# Patient Record
Sex: FEMALE | NOT HISPANIC OR LATINO | Employment: FULL TIME | URBAN - METROPOLITAN AREA
[De-identification: names, ages, dates, MRNs, and addresses within clinical notes are randomized per-mention and may not be internally consistent; named-entity substitution may affect disease eponyms.]

---

## 2019-09-09 ENCOUNTER — TRANSCRIBE ORDERS (OUTPATIENT)
Dept: MAMMOGRAPHY | Facility: HOSPITAL | Age: 44
End: 2019-09-09

## 2019-09-09 DIAGNOSIS — Z12.31 VISIT FOR SCREENING MAMMOGRAM: Primary | ICD-10-CM

## 2019-09-24 ENCOUNTER — APPOINTMENT (OUTPATIENT)
Dept: MAMMOGRAPHY | Facility: HOSPITAL | Age: 44
End: 2019-09-24

## 2020-10-06 ENCOUNTER — E-VISIT (OUTPATIENT)
Dept: FAMILY MEDICINE CLINIC | Facility: TELEHEALTH | Age: 45
End: 2020-10-06

## 2020-10-06 DIAGNOSIS — B37.9 YEAST INFECTION: Primary | ICD-10-CM

## 2020-10-06 PROCEDURE — 99422 OL DIG E/M SVC 11-20 MIN: CPT | Performed by: NURSE PRACTITIONER

## 2020-10-06 RX ORDER — FLUCONAZOLE 150 MG/1
150 TABLET ORAL EVERY OTHER DAY
Qty: 2 TABLET | Refills: 0 | Status: SHIPPED | OUTPATIENT
Start: 2020-10-06 | End: 2020-10-09

## 2020-10-06 NOTE — PROGRESS NOTES
Anette SANCHEZ Anjum    1975  [id number]    I have reviewed the e-Visit questionnaire and patient's answers, my assessment and plan are as follows:    HPI  Patient reports a complaint of vaginal pain and itching. Has a white milky discharge. Denies fever, genital sores, recent antibiotics, or current breastfeeding. LMP within the last 2 weeks. Similar symptoms in the past were treated with pills for yeast infection. Patient reports she was having similar symptoms prior to menstrual cycle, used a diflucan and symptoms improved. Now that her cycle is over, symptoms have returned and are worse. Cannot see GYN until 10-20-20.     Review of Systems - Negative except those listed in the HPI.      Diagnoses and all orders for this visit:    Yeast infection  -     fluconazole (Diflucan) 150 MG tablet; Take 1 tablet by mouth Every Other Day for 2 doses.          FOLLOW-UP  If symptoms worsen or fail to improve, follow-up with PCP/Urgent Care/Emergency Department.      Time Documentation  Counseled patient  Counseling topics: diagnosis, treatment options and follow up plan  Total encounter time: counseling time more than 50% of visit: 15 minutes        JIM Anthony  10/06/20  10:53 EDT

## 2020-10-06 NOTE — PATIENT INSTRUCTIONS
Vaginal Yeast infection, Adult    Vaginal yeast infection is a condition that causes vaginal discharge as well as soreness, swelling, and redness (inflammation) of the vagina. This is a common condition. Some women get this infection frequently.  What are the causes?  This condition is caused by a change in the normal balance of the yeast (candida) and bacteria that live in the vagina. This change causes an overgrowth of yeast, which causes the inflammation.  What increases the risk?  The condition is more likely to develop in women who:  · Take antibiotic medicines.  · Have diabetes.  · Take birth control pills.  · Are pregnant.  · Douche often.  · Have a weak body defense system (immune system).  · Have been taking steroid medicines for a long time.  · Frequently wear tight clothing.  What are the signs or symptoms?  Symptoms of this condition include:  · White, thick, creamy vaginal discharge.  · Swelling, itching, redness, and irritation of the vagina. The lips of the vagina (vulva) may be affected as well.  · Pain or a burning feeling while urinating.  · Pain during sex.  How is this diagnosed?  This condition is diagnosed based on:  · Your medical history.  · A physical exam.  · A pelvic exam. Your health care provider will examine a sample of your vaginal discharge under a microscope. Your health care provider may send this sample for testing to confirm the diagnosis.  How is this treated?  This condition is treated with medicine. Medicines may be over-the-counter or prescription. You may be told to use one or more of the following:  · Medicine that is taken by mouth (orally).  · Medicine that is applied as a cream (topically).  · Medicine that is inserted directly into the vagina (suppository).  Follow these instructions at home:    Lifestyle  · Do not have sex until your health care provider approves. Tell your sex partner that you have a yeast infection. That person should go to his or her health care  provider and ask if they should also be treated.  · Do not wear tight clothes, such as pantyhose or tight pants.  · Wear breathable cotton underwear.  General instructions  · Take or apply over-the-counter and prescription medicines only as told by your health care provider.  · Eat more yogurt. This may help to keep your yeast infection from returning.  · Do not use tampons until your health care provider approves.  · Try taking a sitz bath to help with discomfort. This is a warm water bath that is taken while you are sitting down. The water should only come up to your hips and should cover your buttocks. Do this 3-4 times per day or as told by your health care provider.  · Do not douche.  · If you have diabetes, keep your blood sugar levels under control.  · Keep all follow-up visits as told by your health care provider. This is important.  Contact a health care provider if:  · You have a fever.  · Your symptoms go away and then return.  · Your symptoms do not get better with treatment.  · Your symptoms get worse.  · You have new symptoms.  · You develop blisters in or around your vagina.  · You have blood coming from your vagina and it is not your menstrual period.  · You develop pain in your abdomen.  Summary  · Vaginal yeast infection is a condition that causes discharge as well as soreness, swelling, and redness (inflammation) of the vagina.  · This condition is treated with medicine. Medicines may be over-the-counter or prescription.  · Take or apply over-the-counter and prescription medicines only as told by your health care provider.  · Do not douche. Do not have sex or use tampons until your health care provider approves.  · Contact a health care provider if your symptoms do not get better with treatment or your symptoms go away and then return.  This information is not intended to replace advice given to you by your health care provider. Make sure you discuss any questions you have with your health care  provider.  Document Released: 09/27/2006 Document Revised: 05/06/2019 Document Reviewed: 05/06/2019  Elsevier Patient Education © 2020 [employer]

## 2020-10-20 ENCOUNTER — OFFICE VISIT (OUTPATIENT)
Dept: OBSTETRICS AND GYNECOLOGY | Facility: CLINIC | Age: 45
End: 2020-10-20

## 2020-10-20 VITALS
BODY MASS INDEX: 36.82 KG/M2 | DIASTOLIC BLOOD PRESSURE: 88 MMHG | HEIGHT: 65 IN | SYSTOLIC BLOOD PRESSURE: 126 MMHG | WEIGHT: 221 LBS

## 2020-10-20 DIAGNOSIS — B37.31 VULVOVAGINAL CANDIDIASIS: ICD-10-CM

## 2020-10-20 DIAGNOSIS — Z01.419 PAP TEST, AS PART OF ROUTINE GYNECOLOGICAL EXAMINATION: Primary | ICD-10-CM

## 2020-10-20 PROCEDURE — 99396 PREV VISIT EST AGE 40-64: CPT | Performed by: OBSTETRICS & GYNECOLOGY

## 2020-10-20 RX ORDER — NORETHINDRONE ACETATE AND ETHINYL ESTRADIOL 1MG-20(21)
KIT ORAL
COMMUNITY
End: 2021-02-23 | Stop reason: SDUPTHER

## 2020-10-20 RX ORDER — FLUCONAZOLE 150 MG/1
150 TABLET ORAL
Qty: 2 TABLET | Refills: 1 | Status: SHIPPED | OUTPATIENT
Start: 2020-10-20 | End: 2021-10-26

## 2020-10-20 NOTE — PROGRESS NOTES
GYN Annual Exam     CC - Here for annual exam.        HPI  Anette Valero is a 44 y.o. female, , who presents for annual well woman exam. Patient's last menstrual period was 10/02/2020..  Periods are regular every 25-35 days, lasting 5 days. .  Dysmenorrhea:moderate, occurring throughout menses.  Patient reports problems with: vaginal itching-states began prior to lmp-increased at end of periods. Pt had evisit and given diflucan x2 doses-some relief but s/s returned. Partner Status: Marital Status: single.  New Partners since last visit: no.  Desires STD Screening: no.    Additional OB/GYN History   Current contraception: contraceptive methods: Abstinence  Desires to: continue contraception  Last Pap :   Last Completed Pap Smear       Status Date      PAP SMEAR Done 2019 Negative        History of abnormal Pap smear: yes - h/o colpo  Family history of uterine, colon, breast, or ovarian cancer: no  Performs monthly Self-Breast Exam: no  Last mammogram:   Last Completed Mammogram       Status Date      MAMMOGRAM No completions recorded         Exercises Regularly: no  Feelings of Anxiety or Depression: no  Tobacco Usage?: No   OB History        1    Para   1    Term   1            AB        Living   1       SAB        TAB        Ectopic        Molar        Multiple        Live Births   1                Health Maintenance   Topic Date Due   • ANNUAL PHYSICAL  1979   • TDAP/TD VACCINES (1 - Tdap) 1995   • INFLUENZA VACCINE  2020   • Annual Gynecologic Pelvic and Breast Exam  2020   • HEPATITIS C SCREENING  10/06/2020   • MAMMOGRAM  10/20/2020   • PAP SMEAR  2022   • Pneumococcal Vaccine 0-64  Aged Out       The additional following portions of the patient's history were reviewed and updated as appropriate: allergies, current medications, past family history, past medical history, past social history, past surgical history and problem list.    Review of Systems  "  Constitutional: Negative.    HENT: Negative.    Respiratory: Negative.    Cardiovascular: Negative.    Gastrointestinal: Negative.    Genitourinary: Negative.    Musculoskeletal: Negative.    Skin: Negative.    Allergic/Immunologic: Negative.    Neurological: Negative.    Hematological: Negative.    Psychiatric/Behavioral: Negative.      All other systems reviewed and are negative.     I have reviewed and agree with the HPI, ROS, and historical information as entered above. Meaghan Jim MD    Objective   /88   Ht 165.1 cm (65\")   Wt 100 kg (221 lb)   LMP 10/02/2020   Breastfeeding No   BMI 36.78 kg/m²     Physical Exam  General:  well developed; well nourished  no acute distress  Skin:  No suspicious lesions seen  Thyroid: normal to inspection and palpation  Breasts:  Examined in supine position  Symmetric without masses or skin dimpling  Nipples normal without inversion, lesions or discharge  There are no palpable axillary nodes  CVS: RRR, no M/R/G, distal pulses wnl  Resp: CTAB, No W/R/R  Abdomen: soft, non-tender; no masses  no umbilical or inguinal hernias are present  no hepato-splenomegaly  Pelvis: Clinical staff was present for exam  External genitalia:  normal appearance of the external genitalia including Bartholin's and [employer]'s glands.  Urethra: no masses or tenderness  Urethral meatus: normal size;  No lesions or signs of prolapse  Bladder: non tender to palpation, no masses, no prolapse  Vagina:  normal pink mucosa without prolapse or lesions.  White discharge present.  Cervix:  normal appearance.  Uterus:  normal size, shape and consistency.  Adnexa:  normal bimanual exam of the adnexa.  Perineum/Anus: normal appearance, no external hemorrhoids  Ext: 2+ pulses, no edema     Assessment and Plan    Problem List Items Addressed This Visit     Pap test, as part of routine gynecological examination - Primary    Relevant Orders    Pap IG, Rfx HPV ASCU    Vulvovaginal candidiasis    Relevant " Medications    fluconazole (Diflucan) 150 MG tablet          1. GYN annual well woman exam.   2. Reviewed monthly self breast exams.  Instructed to call with lumps, pain, or breast discharge.    3. Ordered Mammogram today  4. Reviewed BMI and weight loss as preventative health measures.   5. Reviewed exercise as a preventative health measures.   6. RTC in 1 year or PRN with problems.  7. Other: Pap done   8. Will treat for yeast, but will test on pap as well.    Meaghan Jim MD  10/20/2020

## 2020-10-27 DIAGNOSIS — Z01.419 PAP TEST, AS PART OF ROUTINE GYNECOLOGICAL EXAMINATION: ICD-10-CM

## 2021-02-17 ENCOUNTER — HOSPITAL ENCOUNTER (EMERGENCY)
Facility: HOSPITAL | Age: 46
Discharge: HOME | End: 2021-02-18
Attending: EMERGENCY MEDICINE | Admitting: EMERGENCY MEDICINE

## 2021-02-17 DIAGNOSIS — R10.9 RIGHT FLANK PAIN: Primary | ICD-10-CM

## 2021-02-17 DIAGNOSIS — E87.1 HYPONATREMIA: ICD-10-CM

## 2021-02-17 DIAGNOSIS — R11.2 NON-INTRACTABLE VOMITING WITH NAUSEA, UNSPECIFIED VOMITING TYPE: ICD-10-CM

## 2021-02-17 PROCEDURE — 80053 COMPREHEN METABOLIC PANEL: CPT | Performed by: EMERGENCY MEDICINE

## 2021-02-17 PROCEDURE — 96375 TX/PRO/DX INJ NEW DRUG ADDON: CPT

## 2021-02-17 PROCEDURE — 83690 ASSAY OF LIPASE: CPT | Performed by: EMERGENCY MEDICINE

## 2021-02-17 PROCEDURE — 99283 EMERGENCY DEPT VISIT LOW MDM: CPT

## 2021-02-17 PROCEDURE — 85025 COMPLETE CBC W/AUTO DIFF WBC: CPT | Performed by: EMERGENCY MEDICINE

## 2021-02-17 PROCEDURE — 81025 URINE PREGNANCY TEST: CPT | Performed by: EMERGENCY MEDICINE

## 2021-02-17 PROCEDURE — 96374 THER/PROPH/DIAG INJ IV PUSH: CPT

## 2021-02-17 RX ORDER — ONDANSETRON 2 MG/ML
4 INJECTION INTRAMUSCULAR; INTRAVENOUS ONCE
Status: COMPLETED | OUTPATIENT
Start: 2021-02-17 | End: 2021-02-18

## 2021-02-17 RX ORDER — HYDROMORPHONE HYDROCHLORIDE 1 MG/ML
0.5 INJECTION, SOLUTION INTRAMUSCULAR; INTRAVENOUS; SUBCUTANEOUS ONCE
Status: COMPLETED | OUTPATIENT
Start: 2021-02-17 | End: 2021-02-18

## 2021-02-17 RX ORDER — SODIUM CHLORIDE 9 MG/ML
10 INJECTION INTRAVENOUS AS NEEDED
Status: DISCONTINUED | OUTPATIENT
Start: 2021-02-17 | End: 2021-02-18 | Stop reason: HOSPADM

## 2021-02-18 ENCOUNTER — APPOINTMENT (OUTPATIENT)
Dept: CT IMAGING | Facility: HOSPITAL | Age: 46
End: 2021-02-18

## 2021-02-18 VITALS
TEMPERATURE: 98.7 F | SYSTOLIC BLOOD PRESSURE: 144 MMHG | OXYGEN SATURATION: 96 % | HEIGHT: 65 IN | DIASTOLIC BLOOD PRESSURE: 82 MMHG | RESPIRATION RATE: 22 BRPM | HEART RATE: 66 BPM | WEIGHT: 191 LBS | BODY MASS INDEX: 31.82 KG/M2

## 2021-02-18 LAB
ALBUMIN SERPL-MCNC: 4.1 G/DL (ref 3.5–5.2)
ALBUMIN/GLOB SERPL: 1.3 G/DL
ALP SERPL-CCNC: 101 U/L (ref 39–117)
ALT SERPL W P-5'-P-CCNC: 14 U/L (ref 1–33)
ANION GAP SERPL CALCULATED.3IONS-SCNC: 9 MMOL/L (ref 5–15)
AST SERPL-CCNC: 37 U/L (ref 1–32)
B-HCG UR QL: NEGATIVE
BACTERIA UR QL AUTO: ABNORMAL /HPF
BASOPHILS # BLD AUTO: 0.02 10*3/MM3 (ref 0–0.2)
BASOPHILS NFR BLD AUTO: 0.2 % (ref 0–1.5)
BILIRUB SERPL-MCNC: 0.6 MG/DL (ref 0–1.2)
BILIRUB UR QL STRIP: NEGATIVE
BUN SERPL-MCNC: 8 MG/DL (ref 6–20)
BUN/CREAT SERPL: 9.5 (ref 7–25)
CALCIUM SPEC-SCNC: 8.8 MG/DL (ref 8.6–10.5)
CHLORIDE SERPL-SCNC: 101 MMOL/L (ref 98–107)
CLARITY UR: CLEAR
CO2 SERPL-SCNC: 21 MMOL/L (ref 22–29)
COLOR UR: ABNORMAL
CREAT SERPL-MCNC: 0.84 MG/DL (ref 0.57–1)
DEPRECATED RDW RBC AUTO: 43.6 FL (ref 37–54)
EOSINOPHIL # BLD AUTO: 0.02 10*3/MM3 (ref 0–0.4)
EOSINOPHIL NFR BLD AUTO: 0.2 % (ref 0.3–6.2)
ERYTHROCYTE [DISTWIDTH] IN BLOOD BY AUTOMATED COUNT: 12.6 % (ref 12.3–15.4)
GFR SERPL CREATININE-BSD FRML MDRD: 74 ML/MIN/1.73
GFR SERPL CREATININE-BSD FRML MDRD: 89 ML/MIN/1.73
GLOBULIN UR ELPH-MCNC: 3.2 GM/DL
GLUCOSE SERPL-MCNC: 133 MG/DL (ref 65–99)
GLUCOSE UR STRIP-MCNC: NEGATIVE MG/DL
HCT VFR BLD AUTO: 43 % (ref 34–46.6)
HGB BLD-MCNC: 14.2 G/DL (ref 12–15.9)
HGB UR QL STRIP.AUTO: ABNORMAL
HOLD SPECIMEN: NORMAL
HYALINE CASTS UR QL AUTO: ABNORMAL /LPF
IMM GRANULOCYTES # BLD AUTO: 0.04 10*3/MM3 (ref 0–0.05)
IMM GRANULOCYTES NFR BLD AUTO: 0.4 % (ref 0–0.5)
INTERNAL NEGATIVE CONTROL: NEGATIVE
INTERNAL POSITIVE CONTROL: POSITIVE
KETONES UR QL STRIP: ABNORMAL
LEUKOCYTE ESTERASE UR QL STRIP.AUTO: NEGATIVE
LIPASE SERPL-CCNC: 16 U/L (ref 13–60)
LYMPHOCYTES # BLD AUTO: 1.13 10*3/MM3 (ref 0.7–3.1)
LYMPHOCYTES NFR BLD AUTO: 9.9 % (ref 19.6–45.3)
Lab: NORMAL
MCH RBC QN AUTO: 31.1 PG (ref 26.6–33)
MCHC RBC AUTO-ENTMCNC: 33 G/DL (ref 31.5–35.7)
MCV RBC AUTO: 94.1 FL (ref 79–97)
MONOCYTES # BLD AUTO: 0.37 10*3/MM3 (ref 0.1–0.9)
MONOCYTES NFR BLD AUTO: 3.3 % (ref 5–12)
NEUTROPHILS NFR BLD AUTO: 86 % (ref 42.7–76)
NEUTROPHILS NFR BLD AUTO: 9.8 10*3/MM3 (ref 1.7–7)
NITRITE UR QL STRIP: NEGATIVE
NRBC BLD AUTO-RTO: 0 /100 WBC (ref 0–0.2)
PH UR STRIP.AUTO: 5.5 [PH] (ref 5–8)
PLATELET # BLD AUTO: 223 10*3/MM3 (ref 140–450)
PMV BLD AUTO: 12.3 FL (ref 6–12)
POTASSIUM SERPL-SCNC: 4 MMOL/L (ref 3.5–5.2)
PROT SERPL-MCNC: 7.3 G/DL (ref 6–8.5)
PROT UR QL STRIP: NEGATIVE
RBC # BLD AUTO: 4.57 10*6/MM3 (ref 3.77–5.28)
RBC # UR: ABNORMAL /HPF
REF LAB TEST METHOD: ABNORMAL
SODIUM SERPL-SCNC: 131 MMOL/L (ref 136–145)
SP GR UR STRIP: 1.01 (ref 1–1.03)
SQUAMOUS #/AREA URNS HPF: ABNORMAL /HPF
UROBILINOGEN UR QL STRIP: ABNORMAL
WBC # BLD AUTO: 11.38 10*3/MM3 (ref 3.4–10.8)
WBC UR QL AUTO: ABNORMAL /HPF
WHOLE BLOOD HOLD SPECIMEN: NORMAL
WHOLE BLOOD HOLD SPECIMEN: NORMAL

## 2021-02-18 PROCEDURE — 25010000002 HYDROMORPHONE 1 MG/ML SOLUTION: Performed by: EMERGENCY MEDICINE

## 2021-02-18 PROCEDURE — 96375 TX/PRO/DX INJ NEW DRUG ADDON: CPT

## 2021-02-18 PROCEDURE — 25010000002 ONDANSETRON PER 1 MG: Performed by: EMERGENCY MEDICINE

## 2021-02-18 PROCEDURE — 81001 URINALYSIS AUTO W/SCOPE: CPT | Performed by: EMERGENCY MEDICINE

## 2021-02-18 PROCEDURE — 96374 THER/PROPH/DIAG INJ IV PUSH: CPT

## 2021-02-18 PROCEDURE — 25010000002 IOPAMIDOL 61 % SOLUTION: Performed by: EMERGENCY MEDICINE

## 2021-02-18 PROCEDURE — 74177 CT ABD & PELVIS W/CONTRAST: CPT

## 2021-02-18 RX ORDER — ONDANSETRON 4 MG/1
4 TABLET, FILM COATED ORAL EVERY 8 HOURS PRN
Qty: 15 TABLET | Refills: 0 | Status: SHIPPED | OUTPATIENT
Start: 2021-02-18 | End: 2021-10-26

## 2021-02-18 RX ADMIN — IOPAMIDOL 100 ML: 612 INJECTION, SOLUTION INTRAVENOUS at 01:35

## 2021-02-18 RX ADMIN — HYDROMORPHONE HYDROCHLORIDE 0.5 MG: 1 INJECTION, SOLUTION INTRAMUSCULAR; INTRAVENOUS; SUBCUTANEOUS at 00:25

## 2021-02-18 RX ADMIN — ONDANSETRON 4 MG: 2 INJECTION INTRAMUSCULAR; INTRAVENOUS at 00:25

## 2021-02-18 RX ADMIN — SODIUM CHLORIDE 1000 ML: 9 INJECTION, SOLUTION INTRAVENOUS at 00:25

## 2021-02-18 NOTE — DISCHARGE INSTRUCTIONS
Follow up with one of the [facility] Primary Care Providers below to setup primary care. If you need assistance coordinating a primary care appointment with a [facility] Primary Care Provider, please contact the Primary Care Coordinators at [phone] for appointment scheduling.    [facility], Primary Care   [address] , [address]   [place], [place] [id number] [phone]    [facility] Internal Medicine & Endocrinology  [address], [address]  [place] [address] [phone]    [facility] Family Medicine  [address], [address]   [place] [address] [phone]    [facility] Primary Care  [address], [address]  [place], [place] [id number] [phone]    [facility], Primary Care,   [address], [address]   [place], [place] [address] [phone]    [facility] Primary Care  [address]  [place], [place] [id number] [phone]    [facility], Primary Care  [address], [address]  [place], [place] [id number] [phone]    [facility] Internal Medicine & Pediatrics  [address], [address]   [place] [address] [phone]    [facility], Primary Care  [address] [address] [phone]      [facility] Primary Care  [address], [address]   [place] [address] [phone]    [facility] Family Medicine  [address] [place] [address] [phone]

## 2021-02-18 NOTE — ED PROVIDER NOTES
"Subjective   45-year-old female presents for evaluation of right-sided back/flank pain accompanied by nausea and vomiting.  She states that her symptoms started acutely at approximately 7 PM and have persisted since that time.  She is unsure as to what may have triggered her symptoms.  She states that the pain seems to be \"coming in waves.\"  She denies any recent diet changes or sick contacts.  No fevers.  The patient states that she has a prior history of kidney stone and that her current symptoms feel \"different.\"  She also states that she went sledding yesterday and landed awkwardly on her stomach; she is concerned that this could be a contributing factor as well.  Pain is currently 8 out of 10 in severity with no aggravating or alleviating factors.          Review of Systems   Gastrointestinal: Positive for nausea and vomiting.   Genitourinary: Positive for flank pain.   Musculoskeletal: Positive for back pain.   All other systems reviewed and are negative.      Past Medical History:   Diagnosis Date   • Abnormal Pap smear of cervix 07/31/2018    LSIL   • Cervical dysplasia    • Hx of mammogram 01/01/2009    WNL PER PT   • Papanicolaou smear 08/12/2019    NEG   • Vaginal itching        Allergies   Allergen Reactions   • Ciprofloxacin Hives   • Tramadol Nausea And Vomiting   • Nsaids Rash       Past Surgical History:   Procedure Laterality Date   • CHOLECYSTECTOMY      LAP   • COLPOSCOPY  08/21/2018    MILD DYSPLASIA   • KIDNEY STONE SURGERY      LITHOTRIPSY   • LEG SURGERY Right 01/01/2012    RODS AND PINS       Family History   Problem Relation Age of Onset   • Ovarian cysts Sister    • Fibroids Maternal Grandmother         LEIOMYOMA OF UTERUS   • Ovarian cancer Neg Hx    • Uterine cancer Neg Hx    • Colon cancer Neg Hx    • Breast cancer Neg Hx        Social History     Socioeconomic History   • Marital status:      Spouse name: Not on file   • Number of children: Not on file   • Years of education: " Not on file   • Highest education level: Not on file   Tobacco Use   • Smoking status: Never Smoker   • Smokeless tobacco: Never Used   Substance and Sexual Activity   • Alcohol use: Yes     Comment: CURRENT SOME DAY   • Drug use: Never   • Sexual activity: Not Currently     Birth control/protection: None           Objective   Physical Exam  Vitals signs and nursing note reviewed.   Constitutional:       General: She is not in acute distress.     Appearance: She is well-developed. She is not diaphoretic.      Comments: Nontoxic-appearing female   HENT:      Head: Normocephalic and atraumatic.   Eyes:      Pupils: Pupils are equal, round, and reactive to light.   Neck:      Musculoskeletal: Normal range of motion and neck supple.      Comments: No meningeal signs or nuchal rigidity  Cardiovascular:      Rate and Rhythm: Normal rate and regular rhythm.      Heart sounds: Normal heart sounds. No murmur. No friction rub. No gallop.    Pulmonary:      Effort: Pulmonary effort is normal. No respiratory distress.      Breath sounds: Normal breath sounds. No wheezing or rales.   Abdominal:      General: Bowel sounds are normal. There is no distension.      Palpations: Abdomen is soft. There is no mass.      Tenderness: There is no abdominal tenderness. There is no guarding or rebound.      Comments: No focal abdominal tenderness noted, no peritoneal signs, no pain out of proportion to exam   Genitourinary:     Comments: Mild right-sided CVA tenderness is noted  Musculoskeletal: Normal range of motion.   Skin:     General: Skin is warm and dry.      Findings: No erythema or rash.      Comments: No dermatomal rash noted right flank   Neurological:      General: No focal deficit present.      Mental Status: She is alert and oriented to person, place, and time.   Psychiatric:         Mood and Affect: Mood normal.         Thought Content: Thought content normal.         Judgment: Judgment normal.         Procedures           ED  Course  ED Course as of Feb 18 0317   Thu Feb 18, 2021   0052 45-year-old female presents for evaluation of right-sided back pain and flank pain accompanied by nausea and vomiting.  She states that her symptoms started at approximately 7 PM and have been persistent since that time.  On arrival to the ED, the patient is nontoxic-appearing.  Nonsurgical abdomen.  She has mild right-sided CVA tenderness noted on exam with no dermatomal rash to her right flank.  We will obtain labs and imaging, and we will reassess following IV fluids and medications.    [DD]   0205 CT concerning for pyelonephritis versus recently passed stone with mild right perinephric stranding noted.  Given clinical correlation with the patient's urinalysis, she does not appear to have pyelonephritis, and given the acute onset nature of her symptoms and overall clinical presentation, feel that a passed stone is most likely.  Upon reevaluation, the patient feels markedly improved.  Reassured and counseled regarding symptomatic management.  Prescription for Zofran as needed.  I advised her to follow-up with her primary care physician within the next week and also gave her a referral to Dr. Garcia of urology, and she will follow-up within the next week as well.  Agreeable with plan and given appropriate strict return precautions.    [DD]      ED Course User Index  [DD] Rohit Yung MD                                 Recent Results (from the past 24 hour(s))   Comprehensive Metabolic Panel    Collection Time: 02/17/21 11:43 PM    Specimen: Blood   Result Value Ref Range    Glucose 133 (H) 65 - 99 mg/dL    BUN 8 6 - 20 mg/dL    Creatinine 0.84 0.57 - 1.00 mg/dL    Sodium 131 (L) 136 - 145 mmol/L    Potassium 4.0 3.5 - 5.2 mmol/L    Chloride 101 98 - 107 mmol/L    CO2 21.0 (L) 22.0 - 29.0 mmol/L    Calcium 8.8 8.6 - 10.5 mg/dL    Total Protein 7.3 6.0 - 8.5 g/dL    Albumin 4.10 3.50 - 5.20 g/dL    ALT (SGPT) 14 1 - 33 U/L    AST (SGOT) 37 (H) 1 -  32 U/L    Alkaline Phosphatase 101 39 - 117 U/L    Total Bilirubin 0.6 0.0 - 1.2 mg/dL    eGFR Non African Amer 74 >60 mL/min/1.73    eGFR  African Amer 89 >60 mL/min/1.73    Globulin 3.2 gm/dL    A/G Ratio 1.3 g/dL    BUN/Creatinine Ratio 9.5 7.0 - 25.0    Anion Gap 9.0 5.0 - 15.0 mmol/L   Lipase    Collection Time: 02/17/21 11:43 PM    Specimen: Blood   Result Value Ref Range    Lipase 16 13 - 60 U/L   Light Blue Top    Collection Time: 02/17/21 11:43 PM   Result Value Ref Range    Extra Tube hold for add-on    Green Top (Gel)    Collection Time: 02/17/21 11:43 PM   Result Value Ref Range    Extra Tube Hold for add-ons.    Lavender Top    Collection Time: 02/17/21 11:43 PM   Result Value Ref Range    Extra Tube hold for add-on    Gold Top - SST    Collection Time: 02/17/21 11:43 PM   Result Value Ref Range    Extra Tube Hold for add-ons.    Gray Top - Ice    Collection Time: 02/17/21 11:43 PM   Result Value Ref Range    Extra Tube Hold for add-ons.    CBC Auto Differential    Collection Time: 02/17/21 11:43 PM    Specimen: Blood   Result Value Ref Range    WBC 11.38 (H) 3.40 - 10.80 10*3/mm3    RBC 4.57 3.77 - 5.28 10*6/mm3    Hemoglobin 14.2 12.0 - 15.9 g/dL    Hematocrit 43.0 34.0 - 46.6 %    MCV 94.1 79.0 - 97.0 fL    MCH 31.1 26.6 - 33.0 pg    MCHC 33.0 31.5 - 35.7 g/dL    RDW 12.6 12.3 - 15.4 %    RDW-SD 43.6 37.0 - 54.0 fl    MPV 12.3 (H) 6.0 - 12.0 fL    Platelets 223 140 - 450 10*3/mm3    Neutrophil % 86.0 (H) 42.7 - 76.0 %    Lymphocyte % 9.9 (L) 19.6 - 45.3 %    Monocyte % 3.3 (L) 5.0 - 12.0 %    Eosinophil % 0.2 (L) 0.3 - 6.2 %    Basophil % 0.2 0.0 - 1.5 %    Immature Grans % 0.4 0.0 - 0.5 %    Neutrophils, Absolute 9.80 (H) 1.70 - 7.00 10*3/mm3    Lymphocytes, Absolute 1.13 0.70 - 3.10 10*3/mm3    Monocytes, Absolute 0.37 0.10 - 0.90 10*3/mm3    Eosinophils, Absolute 0.02 0.00 - 0.40 10*3/mm3    Basophils, Absolute 0.02 0.00 - 0.20 10*3/mm3    Immature Grans, Absolute 0.04 0.00 - 0.05 10*3/mm3    nRBC  0.0 0.0 - 0.2 /100 WBC   Urinalysis With Microscopic If Indicated (No Culture) - Urine, Clean Catch    Collection Time: 02/18/21  1:16 AM    Specimen: Urine, Clean Catch   Result Value Ref Range    Color, UA Orange (A) Yellow, Straw    Appearance, UA Clear Clear    pH, UA 5.5 5.0 - 8.0    Specific Gravity, UA 1.009 1.001 - 1.030    Glucose, UA Negative Negative    Ketones, UA 40 mg/dL (2+) (A) Negative    Bilirubin, UA Negative Negative    Blood, UA Moderate (2+) (A) Negative    Protein, UA Negative Negative    Leuk Esterase, UA Negative Negative    Nitrite, UA Negative Negative    Urobilinogen, UA 0.2 E.U./dL 0.2 - 1.0 E.U./dL   POC Pregnancy, Urine    Collection Time: 02/18/21  1:16 AM    Specimen: Urine   Result Value Ref Range    HCG, Urine, QL Negative Negative    Lot Number [id number]     Internal Positive Control Positive     Internal Negative Control Negative    Urinalysis, Microscopic Only - Urine, Clean Catch    Collection Time: 02/18/21  1:16 AM    Specimen: Urine, Clean Catch   Result Value Ref Range    RBC, UA 13-20 (A) None Seen, 0-2 /HPF    WBC, UA 0-2 None Seen, 0-2 /HPF    Bacteria, UA None Seen None Seen, Trace /HPF    Squamous Epithelial Cells, UA 0-2 None Seen, 0-2 /HPF    Hyaline Casts, UA 0-6 0 - 6 /LPF    Methodology Automated Microscopy      Note: In addition to lab results from this visit, the labs listed above may include labs taken at another facility or during a different encounter within the last 24 hours. Please correlate lab times with ED admission and discharge times for further clarification of the services performed during this visit.    CT Abdomen Pelvis With Contrast   Final Result      1. Mild right perinephric inflammatory stranding, diminished right renal nephrogram, and urothelial enhancement of the right ureter. Mild prominence of the right renal collecting system and right ureter without visible radiopaque obstructing ureteral   stone. The findings are concerning for  pyelonephritis in the appropriate clinical setting. The findings could also be secondary to a recently passed urinary tract stone. There are are multiple bilateral nonobstructing intrarenal calculi.   2. Appendix not well visualized. No pericecal inflammation.   3. Moderate stool burden.   4. Cholecystectomy.               Signer Name: Jagdish Maher MD    Signed: 2/18/2021 1:57 AM    Work[facility] Name: [email or web address]     [facility]        Vitals:    02/18/21 0000 02/18/21 0015 02/18/21 0030 02/18/21 0100   BP: 141/91 139/86 130/74 144/82   Pulse:       Resp:       Temp:       TempSrc:       SpO2: 96% 96% 95% 96%   Weight:       Height:         Medications   Sodium Chloride (PF) 0.9 % 10 mL (has no administration in time range)   sodium chloride 0.9 % bolus 1,000 mL (0 mL Intravenous Stopped 2/18/21 0207)   HYDROmorphone (DILAUDID) injection 0.5 mg (0.5 mg Intravenous Given 2/18/21 0025)   ondansetron (ZOFRAN) injection 4 mg (4 mg Intravenous Given 2/18/21 0025)   iopamidol (ISOVUE-300) 61 % injection 100 mL (100 mL Intravenous Given 2/18/21 0135)     ECG/EMG Results (last 24 hours)     ** No results found for the last 24 hours. **        No orders to display                 MDM    Final diagnoses:   Right flank pain   Non-intractable vomiting with nausea, unspecified vomiting type   Hyponatremia            Rohit Yung MD  02/18/21 0912

## 2021-02-21 ENCOUNTER — PATIENT MESSAGE (OUTPATIENT)
Dept: OBSTETRICS AND GYNECOLOGY | Facility: CLINIC | Age: 46
End: 2021-02-21

## 2021-02-23 RX ORDER — NORETHINDRONE ACETATE AND ETHINYL ESTRADIOL 1MG-20(21)
1 KIT ORAL DAILY
Qty: 28 TABLET | Refills: 5 | Status: SHIPPED | OUTPATIENT
Start: 2021-02-23 | End: 2021-06-10

## 2021-02-23 NOTE — TELEPHONE ENCOUNTER
From: Anette Valero  To: Meaghan Jim MD  Sent: 2/21/2021 11:06 AM EST  Subject: Prescription Question    My birth control prescription for Junel/Fe has run out. Could you please refill it to [facility] on [address]?

## 2021-06-13 RX ORDER — NORETHINDRONE ACETATE AND ETHINYL ESTRADIOL 1MG-20(21)
1 KIT ORAL DAILY
Qty: 84 TABLET | Refills: 1 | Status: SHIPPED | OUTPATIENT
Start: 2021-06-13 | End: 2021-10-26

## 2021-10-26 ENCOUNTER — OFFICE VISIT (OUTPATIENT)
Dept: OBSTETRICS AND GYNECOLOGY | Facility: CLINIC | Age: 46
End: 2021-10-26

## 2021-10-26 VITALS
BODY MASS INDEX: 34.16 KG/M2 | WEIGHT: 205 LBS | SYSTOLIC BLOOD PRESSURE: 110 MMHG | DIASTOLIC BLOOD PRESSURE: 74 MMHG | HEIGHT: 65 IN

## 2021-10-26 DIAGNOSIS — N91.2 AMENORRHEA: ICD-10-CM

## 2021-10-26 DIAGNOSIS — Z01.419 ROUTINE GYNECOLOGICAL EXAMINATION: Primary | ICD-10-CM

## 2021-10-26 DIAGNOSIS — B37.31 VULVOVAGINAL CANDIDIASIS: ICD-10-CM

## 2021-10-26 DIAGNOSIS — Z01.419 PAP TEST, AS PART OF ROUTINE GYNECOLOGICAL EXAMINATION: ICD-10-CM

## 2021-10-26 PROBLEM — Z87.42 HISTORY OF ABNORMAL CERVICAL PAP SMEAR: Status: ACTIVE | Noted: 2021-10-26

## 2021-10-26 PROCEDURE — 99396 PREV VISIT EST AGE 40-64: CPT | Performed by: OBSTETRICS & GYNECOLOGY

## 2021-10-26 RX ORDER — NORETHINDRONE ACETATE AND ETHINYL ESTRADIOL 1MG-20(21)
1 KIT ORAL DAILY
COMMUNITY
End: 2021-10-26 | Stop reason: SDUPTHER

## 2021-10-26 RX ORDER — NORETHINDRONE ACETATE AND ETHINYL ESTRADIOL 1MG-20(21)
1 KIT ORAL DAILY
Qty: 90 TABLET | Refills: 4 | Status: SHIPPED | OUTPATIENT
Start: 2021-10-26 | End: 2022-11-04

## 2021-10-26 NOTE — PROGRESS NOTES
GYN Annual Exam     CC - Here for annual exam and amenorrhea. Patient is an established patient.     HPI  Anette Valero is a 45 y.o. female, , who presents for annual well woman exam. Her previous partner had several partners and she would like STD screening. She denies any vaginal symptoms. She stopped her OCP when she didn't have a period and would like to re-start if her BHCG is negative.  Periods are absent since July and with negative UPTs. She would like a BHCG.  Patient reports problems with: none.      Partner Status: Marital Status: single.  New Partners since last visit: yes.  Desires STD Screening: yes.    Additional OB/GYN History   Current contraception: contraceptive methods: None  Desires to: continue contraception    Last Pap : 2019 negative  Last Completed Pap Smear          Ordered - PAP SMEAR (Every 3 Years) Ordered on 10/26/2021    10/20/2020  Pap IG, Rfx HPV ASCU    2019  Done - Negative              History of abnormal Pap smear: Yes  Family history of uterine, colon, breast, or ovarian cancer: no  Performs monthly Self-Breast Exam: yes  Last mammogram: a few years ago negative  Last Completed Mammogram     This patient has no relevant Health Maintenance data.         Exercises Regularly: no  Feelings of Anxiety or Depression: no  Tobacco Usage?: No   OB History        1    Para   1    Term   1            AB        Living   1       SAB        IAB        Ectopic        Molar        Multiple        Live Births   1                Health Maintenance   Topic Date Due   • ANNUAL PHYSICAL  Never done   • COVID-19 Vaccine (1) Never done   • HEPATITIS C SCREENING  Never done   • MAMMOGRAM  Never done   • INFLUENZA VACCINE  2021   • Annual Gynecologic Pelvic and Breast Exam  10/21/2021   • PAP SMEAR  10/20/2023   • TDAP/TD VACCINES (2 - Td or Tdap) 2028   • Pneumococcal Vaccine 0-64  Aged Out       The additional following portions of the patient's history were  "reviewed and updated as appropriate: problem list.    Review of Systems   All other systems reviewed and are negative.      I have reviewed and agree with the HPI, ROS, and historical information as entered above. Meaghan Jim MD    Objective   /74   Ht 165.1 cm (65\")   Wt 93 kg (205 lb)   BMI 34.11 kg/m²     Physical Exam    General:  well developed; overweight, no acute distress  Skin:  No suspicious lesions seen  Thyroid: normal to inspection and palpation  Breasts:  Examined in supine position  Symmetric without masses or skin dimpling  Nipples normal without inversion, lesions or discharge  There are no palpable axillary nodes  CVS: RRR, no M/R/G, distal pulses wnl  Resp: CTAB, No W/R/R  Abdomen: soft, non-tender; no masses  no umbilical or inguinal hernias are present  no hepato-splenomegaly  Pelvis: Clinical staff was present for exam  External genitalia:  normal appearance of the external genitalia including Bartholin's and [place]'s glands.  Urethra: no masses or tenderness  Urethral meatus: normal size;  No lesions or signs of prolapse  Bladder: non tender to palpation, no masses, no prolapse  Vagina:  normal pink mucosa without prolapse or lesions.  Cervix:  normal appearance.  Uterus:  normal size, shape and consistency.  Adnexa:  normal bimanual exam of the adnexa.  Perineum/Anus: normal appearance, no external hemorrhoids  Ext: 2+ pulses, no edema    Assessment/Plan     Assessment     Problem List Items Addressed This Visit     Pap test, as part of routine gynecological examination    Vulvovaginal candidiasis    Amenorrhea    Relevant Orders    HCG, B-subunit, Quantitative    Prolactin    TSH    T4, Free    Estradiol    Luteinizing Hormone    Follicle Stimulating Hormone    HIV-1 / O / 2 Ag / Antibody 4th Generation    RPR    Hepatitis Panel, Acute    Routine gynecological examination - Primary    Relevant Orders    Pap IG, Ct-Ng TV Rfx HPV ASCU    Mammo Screening Digital Tomosynthesis " Bilateral With CAD          1. GYN annual well woman exam.     Plan     1. Pap screening guidelines reviewed  2. Pap/ReflexHPV/G/C done  3. Encouraged use of condoms for STD prevention.  4. OCP's/Vaginal Ring - Discussed side effects of nausea, BTB, headaches, breast tenderness and slight weight gain in the first three cycles.  Understands risks of blood clots, stroke, and theoretical risk of breast cancer.  Denies family history of blood clots.  5. Reviewed monthly self breast exams.  Instructed to call with lumps, pain, or breast discharge.    6. Reviewed exercise as a preventative health measures.   7. RTC in 1 year or PRN with problems.      Meaghan Jim MD  10/26/2021

## 2021-10-27 LAB
ESTRADIOL SERPL-MCNC: 16 PG/ML
FSH SERPL-ACNC: 95.9 MIU/ML
HCG INTACT+B SERPL-ACNC: <0.5 MIU/ML
HIV 1+2 AB+HIV1 P24 AG SERPL QL IA: NON REACTIVE
LH SERPL-ACNC: 58.3 MIU/ML
PROLACTIN SERPL-MCNC: 7.8 NG/ML (ref 4.8–23.3)
RPR SER QL: NON REACTIVE
T4 FREE SERPL-MCNC: 1.15 NG/DL (ref 0.93–1.7)
TSH SERPL DL<=0.005 MIU/L-ACNC: 1.35 UIU/ML (ref 0.27–4.2)

## 2021-11-03 DIAGNOSIS — Z01.419 ROUTINE GYNECOLOGICAL EXAMINATION: ICD-10-CM

## 2022-11-04 RX ORDER — NORETHINDRONE ACETATE AND ETHINYL ESTRADIOL AND FERROUS FUMARATE 1MG-20(21)
KIT ORAL
Qty: 84 TABLET | Refills: 0 | Status: SHIPPED | OUTPATIENT
Start: 2022-11-04

## 2023-01-30 RX ORDER — NORETHINDRONE ACETATE AND ETHINYL ESTRADIOL AND FERROUS FUMARATE 1MG-20(21)
KIT ORAL
Qty: 84 TABLET | Refills: 0 | OUTPATIENT
Start: 2023-01-30

## 2023-07-21 PROBLEM — N84.1 CERVICAL POLYP: Status: ACTIVE | Noted: 2023-07-21

## 2024-06-20 DIAGNOSIS — Z30.41 ENCOUNTER FOR SURVEILLANCE OF CONTRACEPTIVE PILLS: ICD-10-CM

## 2024-06-20 RX ORDER — NORETHINDRONE ACETATE AND ETHINYL ESTRADIOL AND FERROUS FUMARATE 1MG-20(21)
KIT ORAL
Qty: 84 TABLET | Refills: 0 | Status: SHIPPED | OUTPATIENT
Start: 2024-06-20

## 2024-07-23 ENCOUNTER — OFFICE VISIT (OUTPATIENT)
Dept: OBSTETRICS AND GYNECOLOGY | Facility: CLINIC | Age: 49
End: 2024-07-23
Payer: COMMERCIAL

## 2024-07-23 VITALS
BODY MASS INDEX: 37.19 KG/M2 | HEIGHT: 65 IN | DIASTOLIC BLOOD PRESSURE: 82 MMHG | SYSTOLIC BLOOD PRESSURE: 110 MMHG | WEIGHT: 223.2 LBS

## 2024-07-23 DIAGNOSIS — R23.2 HOT FLASHES: ICD-10-CM

## 2024-07-23 DIAGNOSIS — Z01.419 WOMEN'S ANNUAL ROUTINE GYNECOLOGICAL EXAMINATION: Primary | ICD-10-CM

## 2024-07-23 DIAGNOSIS — Z12.31 SCREENING MAMMOGRAM FOR BREAST CANCER: ICD-10-CM

## 2024-07-23 PROCEDURE — 99213 OFFICE O/P EST LOW 20 MIN: CPT | Performed by: NURSE PRACTITIONER

## 2024-07-23 PROCEDURE — 99396 PREV VISIT EST AGE 40-64: CPT | Performed by: NURSE PRACTITIONER

## 2024-07-23 RX ORDER — AMILORIDE HYDROCHLORIDE AND HYDROCHLOROTHIAZIDE 5; 50 MG/1; MG/1
TABLET ORAL
COMMUNITY
Start: 2024-04-08

## 2024-07-23 NOTE — PROGRESS NOTES
Gynecologic Annual Exam Note          GYN Annual Exam     Gynecologic Exam        Subjective     HPI  Anette Valero is a 48 y.o. female, , who presents for annual well woman exam as a established patient. There were no changes to her medical or surgical history since her last visit..  Patient's last menstrual period was 2024 (exact date).  Her periods occur every 25-35 days, lasting 5 days.  The flow is moderate. She reports dysmenorrhea is mild occurring first 1-2 days of flow. Marital Status: single. She is sexually active. She has not had new partners.. STD testing recommendations have been explained to the patient and she declines STD testing.    The patient would like to discuss the following complaints today: Patient states she stopped her birth control pills this month. She was recently diagnosed with high blood pressure and had read that birth control pills can sometimes cause hypertension. She would like to discuss alternative contraception.    Additional OB/GYN History   contraceptive methods: OCP (estrogen/progesterone)  Desires to: discuss contraception  History of migraines: yes with aura    Last Pap : 2023. Result: negative. HPV: negative.   Last Completed Pap Smear            PAP SMEAR (Every 3 Years) Next due on 2023  LIQUID-BASED PAP SMEAR WITH HPV GENOTYPING REGARDLESS OF INTERPRETATION (MARY,COR,MAD)    10/26/2021  SCANNED - PAP SMEAR    10/20/2020  Pap IG, Rfx HPV ASCU    2019  Done - Negative                  History of abnormal Pap smear:  yes - 2018 Colposcopy- mild dysplasia  Family history of uterine, colon, breast, or ovarian cancer: no  Performs monthly Self-Breast Exam: no  Last mammogram:  . Done in [place]. Patient states she had a benign cyst .    Last Completed Mammogram       This patient has no relevant Health Maintenance data.            Colonoscopy: has had a cologuard summer 2023. Patient states results were  normal  Exercises Regularly: no  Feelings of Anxiety or Depression: no  Tobacco Usage?: No       Current Outpatient Medications:     aMILoride-hydrochlorothiazide (MODURETIC) 5-50 MG per tablet, , Disp: , Rfl:     norethindrone-ethinyl estradiol FE (Junel FE 1/20) 1-20 MG-MCG per tablet, TAKE 1 TABLET BY MOUTH DAILY  DAYS. (Patient not taking: Reported on 2024), Disp: 84 tablet, Rfl: 0     Patient denies the need for medication refills today.    OB History          1    Para   1    Term   1            AB        Living   1         SAB        IAB        Ectopic        Molar        Multiple        Live Births   1                Past Medical History:   Diagnosis Date    Abnormal Pap smear of cervix 2018    LSIL    Cervical dysplasia     Hx of mammogram 2009    WNL PER PT    Hypertension 2024    Kidney stone 2022    Papanicolaou smear 2019    NEG    PONV (postoperative nausea and vomiting) 2018    Shingles     Vaginal itching     Varicella At age 7        Past Surgical History:   Procedure Laterality Date    COLPOSCOPY  2018    MILD DYSPLASIA    KIDNEY STONE SURGERY      LITHOTRIPSY    LAPAROSCOPIC CHOLECYSTECTOMY  2012    LEG SURGERY Right 2012    RODS AND PINS       Health Maintenance   Topic Date Due    MAMMOGRAM  Never done    BMI FOLLOWUP  Never done    HEPATITIS C SCREENING  Never done    ANNUAL PHYSICAL  Never done    COVID-19 Vaccine (2023- season) 2023    Annual Gynecologic Pelvic and Breast Exam  2024    INFLUENZA VACCINE  2024    PAP SMEAR  2026    COLORECTAL CANCER SCREENING  2026    TDAP/TD VACCINES (2 - Td or Tdap) 2028    Pneumococcal Vaccine 0-64  Aged Out       The additional following portions of the patient's history were reviewed and updated as appropriate: allergies, current medications, past family history, past medical history, past social history, past surgical history, and problem  "list.    Review of Systems   Constitutional: Negative.    Respiratory: Negative.     Cardiovascular: Negative.    Gastrointestinal: Negative.    Endocrine: Positive for heat intolerance.   Genitourinary: Negative.    All other systems reviewed and are negative.        I have reviewed and agree with the HPI, ROS, and historical information as entered above. Dinah Muñozkman, APRN          Objective   /82   Ht 165.1 cm (65\")   Wt 101 kg (223 lb 3.2 oz)   LMP 07/02/2024 (Exact Date)   BMI 37.14 kg/m²     Physical Exam  Constitutional:       Appearance: Normal appearance.   Neck:      Thyroid: No thyroid mass or thyromegaly.   Pulmonary:      Effort: Pulmonary effort is normal.   Chest:      Chest wall: No mass.   Breasts:     Right: Normal. No inverted nipple, mass, nipple discharge or skin change.      Left: Normal. No inverted nipple, mass, nipple discharge or skin change.   Abdominal:      General: There is no distension.      Palpations: Abdomen is soft. There is no mass.      Tenderness: There is no abdominal tenderness.      Hernia: No hernia is present.   Genitourinary:     General: Normal vulva.      Labia:         Right: No rash.         Left: No rash.       Vagina: Normal.      Cervix: No cervical motion tenderness or lesion.      Uterus: Normal.       Adnexa: Right adnexa normal and left adnexa normal.        Right: No mass or tenderness.          Left: No mass or tenderness.     Neurological:      Mental Status: She is alert.            Assessment and Plan    Problem List Items Addressed This Visit    None  Visit Diagnoses       Women's annual routine gynecological examination    -  Primary    Hot flashes        Relevant Orders    Follicle Stimulating Hormone    Estradiol    Screening mammogram for breast cancer        Relevant Orders    Mammo Screening Digital Tomosynthesis Bilateral With CAD          She stopped her KENDRICK early July due to elevated blood pressure. She reports early menopause for " numerous female family members, she admits to frequent hot flashes. I gave her 3 months sample of slynd. She will return for FSH, estradiol in 3 months and we will evaluate continued need for contraception. She understands the blood work varies and is not definitive diagnosis of menopause.     GYN annual well woman exam.   Pap guidelines reviewed.  Reviewed monthly self breast exams.  Instructed to call with lumps, pain, or breast discharge.    Ordered Mammogram today  Symptoms of menopausal transition reviewed with patient.   Return in about 1 year (around 7/23/2025), or if symptoms worsen or fail to improve, for Annual physical.     Dinah Dc, APRN  07/23/2024

## 2024-09-16 LAB
NCCN CRITERIA FLAG: NORMAL
TYRER CUZICK SCORE: 10.3

## 2024-10-01 ENCOUNTER — LAB (OUTPATIENT)
Dept: OBSTETRICS AND GYNECOLOGY | Facility: CLINIC | Age: 49
End: 2024-10-01
Payer: COMMERCIAL

## 2024-10-01 ENCOUNTER — HOSPITAL ENCOUNTER (OUTPATIENT)
Dept: MAMMOGRAPHY | Facility: HOSPITAL | Age: 49
Discharge: HOME | End: 2024-10-01
Admitting: NURSE PRACTITIONER
Payer: COMMERCIAL

## 2024-10-01 DIAGNOSIS — Z12.31 SCREENING MAMMOGRAM FOR BREAST CANCER: ICD-10-CM

## 2024-10-01 PROCEDURE — 77067 SCR MAMMO BI INCL CAD: CPT

## 2024-10-01 PROCEDURE — 77063 BREAST TOMOSYNTHESIS BI: CPT

## 2024-10-09 ENCOUNTER — HOSPITAL ENCOUNTER (OUTPATIENT)
Dept: ULTRASOUND IMAGING | Facility: HOSPITAL | Age: 49
Discharge: HOME | End: 2024-10-09
Payer: COMMERCIAL

## 2024-10-09 ENCOUNTER — HOSPITAL ENCOUNTER (OUTPATIENT)
Dept: MAMMOGRAPHY | Facility: HOSPITAL | Age: 49
Discharge: HOME | End: 2024-10-09
Payer: COMMERCIAL

## 2024-10-09 DIAGNOSIS — R92.8 ABNORMAL MAMMOGRAM: ICD-10-CM

## 2024-10-09 PROCEDURE — 76642 ULTRASOUND BREAST LIMITED: CPT

## 2024-10-09 PROCEDURE — 77066 DX MAMMO INCL CAD BI: CPT

## 2024-10-09 PROCEDURE — G0279 TOMOSYNTHESIS, MAMMO: HCPCS
